# Patient Record
Sex: MALE | Race: WHITE | Employment: OTHER | URBAN - METROPOLITAN AREA
[De-identification: names, ages, dates, MRNs, and addresses within clinical notes are randomized per-mention and may not be internally consistent; named-entity substitution may affect disease eponyms.]

---

## 2017-04-25 ENCOUNTER — HOSPITAL ENCOUNTER (OUTPATIENT)
Dept: PHYSICAL THERAPY | Age: 57
Discharge: HOME | End: 2017-04-25
Payer: OTHER GOVERNMENT

## 2017-04-25 PROCEDURE — 97161 PT EVAL LOW COMPLEX 20 MIN: CPT

## 2017-04-25 PROCEDURE — 97110 THERAPEUTIC EXERCISES: CPT

## 2017-04-25 NOTE — PROGRESS NOTES
[facility]  [address]  [place], [address]  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 Exercises 3x/week including bike riding. The Plan of Care and following information is based on the information from the initial evaluation. 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 Pt states that he has difficulty with stair descent and ambulation due to quad weakness. He shows limited knee flexion due to lateral pain and reduced quad flexibility. Pt would benefit from PT to address deficits in ROM, strength, ambulation efficiency and balance for return to OF.     Evaluation Complexity History MEDIUM  Complexity : 1-2 comorbidities / personal factors will impact the outcome/ POC ; Examination LOW Complexity : 1-2 Standardized tests and measures addressing body structure, function, activity limitation and / or participation in recreation  ;Presentation LOW Complexity : Stable, uncomplicated  ;Clinical Decision Making MEDIUM Complexity : FOTO score of 26-74  Overall Complexity Rating: LOW   Problem List: pain affecting function, decrease ROM, decrease strength, edema affecting function, impaired gait/ balance, decrease ADL/ functional abilitiies, decrease activity tolerance and decrease flexibility/ joint mobility   Treatment Plan may include any combination of the following: Therapeutic exercise, Therapeutic activities, Neuromuscular re-education, Physical agent/modality, Gait/balance training, Manual therapy, Patient education, Self Care training, Functional mobility training and Stair training  Patient / Family readiness to learn indicated by: asking questions and trying to perform skills  Persons(s) to be included in education: patient (P)  Barriers to Learning/Limitations: none  Patient Goal (s): Back on my feet without pain  Patient Self Reported Health Status: good  Rehabilitation Potential: good    Short Term Goals: To be accomplished in 1 weeks:  1. Pt will be I and compliant with HEP to promote self management of condition. 2. Pt will demonstrate gait pattern with even step length to improve efficiency of community ambulation. Long Term Goals: To be accomplished in 4 weeks:  1. Pt will improve FOTO score to 66 to demonstrate improved quality of life and activity tolerance. 2. Pt will improve R knee flexion AROM to 130 deg for improved functional mobility and work performance. 3. Pt will improve R knee strength to 5/5 without pain for improved stability with community ambulation. 4. Pt will negotiate 4 stairs x3 with reciprocal pattern to improve ease of home entry/exit. Frequency / Duration: Patient to be seen 2 times per week for 4 weeks. Patient/ Caregiver education and instruction: Diagnosis, prognosis, self care and exercises   [x]  Plan of care has been reviewed with ARIANNE De Luna DPT 4/25/2017 5:46 PM    ________________________________________________________________________    I certify that the above Therapy Services are being furnished while the patient is under my care. I agree with the treatment plan and certify that this therapy is necessary.     [de-identified] Signature:____________________  Date:____________Time: _________    Please sign and return to [facility] [address]  [address] [place] [address] [address]  [phone] fax

## 2017-04-25 NOTE — PROGRESS NOTES
PT DAILY TREATMENT NOTE     Patient Name: Silvana Dillon  Date:2017  : 1960  [x]  Patient  Verified  Payor:  / Plan: [facility]  RETIREES AND DEPENDENTS / Product Type: Tania Fulton /    In time:5:03  Out time:5:40  Total Treatment Time (min): 37  Visit #: 1 of 8    Treatment Area: Right knee pain [M25.561]    SUBJECTIVE  Pain Level (0-10 scale): 4-5  Any medication changes, allergies to medications, adverse drug reactions, diagnosis change, or new procedure performed?: [x] No    [] Yes (see summary sheet for update)  Subjective functional status/changes:   [] No changes reported  \"It feels tight and weak\"    OBJECTIVE    25 min [x]Eval                  []Re-Eval       12 min Therapeutic Exercise:  [] See flow sheet :   Rationale: increase ROM, increase strength and improve coordination to improve the patients ability to perform daily tasks and ADLs with increased ease and efficiency              With   [] TE   [] TA   [] neuro   [] other: Patient Education: [x] Review HEP    [] Progressed/Changed HEP based on:   [] positioning   [] body mechanics   [] transfers   [] heat/ice application    [] other:      Other Objective/Functional Measures:   R knee AROM 0-94 deg     Pain Level (0-10 scale) post treatment: 2    ASSESSMENT/Changes in Function: see POC    Patient will continue to benefit from skilled PT services to modify and progress therapeutic interventions, address functional mobility deficits, address ROM deficits, address strength deficits, analyze and address soft tissue restrictions, analyze and cue movement patterns, analyze and modify body mechanics/ergonomics and address imbalance/dizziness to attain remaining goals. []  See Plan of Care  []  See progress note/recertification  []  See Discharge Summary         Progress towards goals / Updated goals:  Short Term Goals: To be accomplished in 1 weeks:  1.  Pt will be I and compliant with HEP to promote self management of condition. 2. Pt will demonstrate gait pattern with even step length to improve efficiency of community ambulation. Long Term Goals: To be accomplished in 4 weeks:  1. Pt will improve FOTO score to 66 to demonstrate improved quality of life and activity tolerance. 2. Pt will improve R knee flexion AROM to 130 deg for improved functional mobility and work performance. 3. Pt will improve R knee strength to 5/5 without pain for improved stability with community ambulation. 4. Pt will negotiate 4 stairs x3 with reciprocal pattern to improve ease of home entry/exit.     PLAN  []  Upgrade activities as tolerated     []  Continue plan of care  []  Update interventions per flow sheet       []  Discharge due to:_  []  Other:_      Jerrica Corrales DPT 4/25/2017  6:02 PM    Future Appointments  Date Time Provider Jacklyn Wilburn   4/27/2017 3:30 PM [id number] [facility]   5/1/2017 2:30 PM [facility]   5/3/2017 3:00 PM Terry Gomez, PT [facility]   5/9/2017 5:30 PM [id number] [facility]   5/12/2017 5:00 PM Terry Gomez, [facility]   5/16/2017 6:00 PM [id number] [place] [address]   5/18/2017 6:00 PM Terry Gomez, PT [facility]

## 2017-04-27 ENCOUNTER — HOSPITAL ENCOUNTER (OUTPATIENT)
Dept: PHYSICAL THERAPY | Age: 57
Discharge: HOME | End: 2017-04-27
Payer: OTHER GOVERNMENT

## 2017-04-27 PROCEDURE — 97140 MANUAL THERAPY 1/> REGIONS: CPT

## 2017-04-27 PROCEDURE — 97016 VASOPNEUMATIC DEVICE THERAPY: CPT

## 2017-04-27 PROCEDURE — 97110 THERAPEUTIC EXERCISES: CPT

## 2017-04-27 PROCEDURE — 97112 NEUROMUSCULAR REEDUCATION: CPT

## 2017-04-27 NOTE — PROGRESS NOTES
PT DAILY TREATMENT NOTE     Patient Name: Farrah Wick  Date:2017  : 1960  [x]  Patient  Verified  Payor:  / Plan: [facility]  RETIREES AND DEPENDENTS / Product Type: Chary Vega /    In time:3:30  Out time:4:28  Total Treatment Time (min): 58  1:1 Treatment Time: 50  Visit #: 2 of 8    Treatment Area: Right knee pain [M25.561]    SUBJECTIVE  Pain Level (0-10 scale): 2-3  Any medication changes, allergies to medications, adverse drug reactions, diagnosis change, or new procedure performed?: [x] No    [] Yes (see summary sheet for update)  Subjective functional status/changes:   [] No changes reported  Pt reports good HEP compliance and tolerance    OBJECTIVE    Modality rationale: decrease edema, decrease inflammation and decrease pain to improve the patients ability to perform daily tasks   Min Type Additional Details    [] Estim:  []Unatt       []IFC  []Premod                        []Other:  []w/ice   []w/heat  Position:  Location:    [] Estim: []Att    []TENS instruct  []NMES                    []Other:  []w/US   []w/ice   []w/heat  Position:  Location:    []  Traction: [] Cervical       []Lumbar                       [] Prone          []Supine                       []Intermittent   []Continuous Lbs:  [] before manual  [] after manual    []  Ultrasound: []Continuous   [] Pulsed                           []1MHz   []3MHz W/cm2:  Location:    []  Iontophoresis with dexamethasone         Location: [] Take home patch   [] In clinic    []  Ice     []  heat  []  Ice massage  []  Laser   []  Anodyne Position:  Location:    []  Laser with stim  []  Other:  Position:  Location:   10 [x]  Vasopneumatic Device Pressure:       [x] lo [] med [] hi   Temperature: [x] lo [] med [] hi   [] Skin assessment post-treatment:  []intact []redness- no adverse reaction    []redness  adverse reaction:       32 min Therapeutic Exercise:  [] See flow sheet :   Rationale: increase ROM and increase strength to improve the patients ability to ambulate and perform ADLs with increased ease    8 min Neuromuscular Re-education:  []  See flow sheet :   Rationale: increase strength, improve coordination and increase proprioception  to improve the patients ability to maintain good quad activation and eccentric motor control for improved stair descent     8 min Manual Therapy:  PROM R knee flexion emphasis, scar massage   Rationale: increase ROM and increase tissue extensibility to improve functional mobility and ADL ease          With   [] TE   [] TA   [] neuro   [] other: Patient Education: [x] Review HEP    [] Progressed/Changed HEP based on:   [] positioning   [] body mechanics   [] transfers   [] heat/ice application    [] other:      Other Objective/Functional Measures: pt requires tactile cuing to prevent L weight shift with mini squats, good carryover noted towards end of set    Tends to fall into hyperextended R stance knee with hip x3     Pain Level (0-10 scale) post treatment: 1    ASSESSMENT/Changes in Function: Pt tolerates first f/u session well with no reports of adverse response. Some quad soreness/discomfort reported with deeper knee flexion. Minimal cuing required for proper exercise technique and mechanics, good carryover noted. Continue per POC. Patient will continue to benefit from skilled PT services to modify and progress therapeutic interventions, address functional mobility deficits, address ROM deficits, address strength deficits, analyze and address soft tissue restrictions, analyze and cue movement patterns, analyze and modify body mechanics/ergonomics and instruct in home and community integration to attain remaining goals. []  See Plan of Care  []  See progress note/recertification  []  See Discharge Summary         Progress towards goals / Updated goals:  Short Term Goals: To be accomplished in 1 weeks:  1.  Pt will be I and compliant with HEP to promote self management of condition. Pt compliant 4/27/17  2. Pt will demonstrate gait pattern with even step length to improve efficiency of community ambulation. Long Term Goals: To be accomplished in 4 weeks:  1. Pt will improve FOTO score to 66 to demonstrate improved quality of life and activity tolerance. 2. Pt will improve R knee flexion AROM to 130 deg for improved functional mobility and work performance. 3. Pt will improve R knee strength to 5/5 without pain for improved stability with community ambulation.   4. Pt will negotiate 4 stairs x3 with reciprocal pattern to improve ease of home entry/exit    PLAN  []  Upgrade activities as tolerated     [x]  Continue plan of care  []  Update interventions per flow sheet       []  Discharge due to:_  []  Other:_      Meeta Luna DPT 4/27/2017  3:36 PM    Future Appointments  Date Time Provider Jacklyn Wilburn   5/1/2017 2:30 PM Rosita Sarah PT [facility]   5/3/2017 3:00 PM Olaf Garcia, PT [facility]   5/9/2017 5:30 PM [address] [facility]   5/12/2017 5:00 PM Olaf Rumautant, PT [facility]   5/16/2017 6:00 PM [id number] [place] [address]   5/18/2017 6:00 PM [place] Adjutant, PT [facility]

## 2017-05-01 ENCOUNTER — HOSPITAL ENCOUNTER (OUTPATIENT)
Dept: PHYSICAL THERAPY | Age: 57
Discharge: HOME | End: 2017-05-01
Payer: OTHER GOVERNMENT

## 2017-05-01 PROCEDURE — 97110 THERAPEUTIC EXERCISES: CPT

## 2017-05-01 PROCEDURE — 97016 VASOPNEUMATIC DEVICE THERAPY: CPT

## 2017-05-01 PROCEDURE — 97112 NEUROMUSCULAR REEDUCATION: CPT

## 2017-05-01 PROCEDURE — 97140 MANUAL THERAPY 1/> REGIONS: CPT

## 2017-05-01 NOTE — PROGRESS NOTES
PT DAILY TREATMENT NOTE     Patient Name: Margot Brennan  Date:2017  : 1960  [x]  Patient  Verified  Payor: [facility] / Plan: [facility]  RETIREES AND DEPENDENTS / Product Type: Aureliano Maw /    In time:3:01  Out time:3:53  Total Treatment Time (min): 52  1:1 Treatment Time: 43  Visit #: 3 of 8    Treatment Area: Right knee pain [M25.561]    SUBJECTIVE  Pain Level (0-10 scale): 4  Any medication changes, allergies to medications, adverse drug reactions, diagnosis change, or new procedure performed?: [x] No    [] Yes (see summary sheet for update)  Subjective functional status/changes:   [] No changes reported  \"My foot slipped on the carpet coming down the stairs before this, I thought I was going to die\"    OBJECTIVE    Modality rationale: decrease edema and decrease pain to improve the patients ability to perform daily tasks and ADLs   Min Type Additional Details    [] Estim:  []Unatt       []IFC  []Premod                        []Other:  []w/ice   []w/heat  Position:  Location:    [] Estim: []Att    []TENS instruct  []NMES                    []Other:  []w/US   []w/ice   []w/heat  Position:  Location:    []  Traction: [] Cervical       []Lumbar                       [] Prone          []Supine                       []Intermittent   []Continuous Lbs:  [] before manual  [] after manual    []  Ultrasound: []Continuous   [] Pulsed                           []1MHz   []3MHz W/cm2:  Location:    []  Iontophoresis with dexamethasone         Location: [] Take home patch   [] In clinic    []  Ice     []  heat  []  Ice massage  []  Laser   []  Anodyne Position:  Location:    []  Laser with stim  []  Other:  Position:  Location:   10 [x]  Vasopneumatic Device Pressure:       [x] lo [] med [] hi   Temperature: [x] lo [] med [] hi   [] Skin assessment post-treatment:  []intact []redness- no adverse reaction    []redness  adverse reaction:       24 min Therapeutic Exercise:  [] See flow sheet : Rationale: increase ROM and increase strength to improve the patients ability to perform daily tasks and ADLs with increased ease and efficiency    10 min Neuromuscular Re-education:  []  See flow sheet :   Rationale: increase strength, improve coordination and increase proprioception  to improve the patients ability to ambulate with improved efficiency and increased quad control    8 min Manual Therapy:  PROM R knee, patellar mobs   Rationale: increase ROM and increase tissue extensibility to improve functional mobility for improved ambulation efficiency         With   [] TE   [] TA   [] neuro   [] other: Patient Education: [x] Review HEP    [] Progressed/Changed HEP based on:   [] positioning   [] body mechanics   [] transfers   [] heat/ice application    [] other:      Other Objective/Functional Measures: pt shows improved step length symmetry with cuing, occasional loss due to quad weakness    R knee AAROM flexion 110 deg in supine     Pain Level (0-10 scale) post treatment: 1    ASSESSMENT/Changes in Function: Pt shows improved R knee ROM today with improved tolerance to manual therapy. He demonstrates limited quad strength eccentrically and with ambulation at this time. Will continue to progress quad stability as tolerated. Patient will continue to benefit from skilled PT services to modify and progress therapeutic interventions, address functional mobility deficits, address ROM deficits, address strength deficits, analyze and address soft tissue restrictions, analyze and cue movement patterns, analyze and modify body mechanics/ergonomics and instruct in home and community integration to attain remaining goals. []  See Plan of Care  []  See progress note/recertification  []  See Discharge Summary         Progress towards goals / Updated goals:  Short Term Goals: To be accomplished in 1 weeks:  1. Pt will be I and compliant with HEP to promote self management of condition. Pt compliant 4/27/17  2.  Pt will demonstrate gait pattern with even step length to improve efficiency of community ambulation. Progressed with cuing, intermittent lapse 5/1/17  Long Term Goals: To be accomplished in 4 weeks:  1. Pt will improve FOTO score to 66 to demonstrate improved quality of life and activity tolerance. 2. Pt will improve R knee flexion AROM to 130 deg for improved functional mobility and work performance. 3. Pt will improve R knee strength to 5/5 without pain for improved stability with community ambulation.   4. Pt will negotiate 4 stairs x3 with reciprocal pattern to improve ease of home entry/exit    PLAN  []  Upgrade activities as tolerated     []  Continue plan of care  []  Update interventions per flow sheet       []  Discharge due to:_  []  Other:_      Isabella Agarwal DPT 5/1/2017  3:01 PM    Future Appointments  Date Time Provider Jacklyn Wilburn   5/3/2017 3:00 PM [facility]   5/9/2017 5:30 PM [address] [facility]   5/12/2017 5:00 PM [facility]   5/16/2017 6:00 PM [address] [place] [address]   5/18/2017 6:00 PM [facility]

## 2017-05-03 ENCOUNTER — HOSPITAL ENCOUNTER (OUTPATIENT)
Dept: PHYSICAL THERAPY | Age: 57
Discharge: HOME | End: 2017-05-03
Payer: OTHER GOVERNMENT

## 2017-05-03 PROCEDURE — 97016 VASOPNEUMATIC DEVICE THERAPY: CPT

## 2017-05-03 PROCEDURE — 97140 MANUAL THERAPY 1/> REGIONS: CPT

## 2017-05-03 PROCEDURE — 97110 THERAPEUTIC EXERCISES: CPT

## 2017-05-03 NOTE — PROGRESS NOTES
PT DAILY TREATMENT NOTE     Patient Name: Silvana Dillon  Date:5/3/2017  : 1960  [x]  Patient  Verified  Payor: [facility] / Plan: [facility]  RETIREES AND DEPENDENTS / Product Type: Tania Fulton /    In time:3:00  Out time:3:53  Total Treatment Time (min): 48  Visit #: 4 of 8    Treatment Area: Right knee pain [M25.561]    SUBJECTIVE  Pain Level (0-10 scale): 10  Any medication changes, allergies to medications, adverse drug reactions, diagnosis change, or new procedure performed?: [x] No    [] Yes (see summary sheet for update)  Subjective functional status/changes:   [] No changes reported  The patient states that he continues to have pain with walking and especially going down stairs. OBJECTIVE  Modality rationale: decrease edema, decrease inflammation and decrease pain to improve the patients ability to improve ADL ease. Min Type Additional Details   10 [x]  Vasopneumatic Device Pressure:       [x] lo [] med [] hi   Temperature: [x] lo [] med [] hi   [x] Skin assessment post-treatment:  [x]intact []redness- no adverse reaction    []redness  adverse reaction:     35 min Therapeutic Exercise:  [x] See flow sheet :   Rationale: increase ROM and increase strength to improve the patients ability to improve ADL ease. 8 min Manual Therapy:  PROM flexion emphasis R knee with manual scar massage. Rationale: decrease pain, increase ROM and increase tissue extensibility to improve ADL ease. With   [] TE   [] TA   [] neuro   [] other: Patient Education: [x] Review HEP    [] Progressed/Changed HEP based on:   [] positioning   [] body mechanics   [] transfers   [] heat/ice application    [] other:      Other Objective/Functional Measures:    Knee flexion: 120 degrees pre-manual, 125 degrees post manual.  Negotiated large hurdles with hip compensation. Pain Level (0-10 scale) post treatment: 2/10    ASSESSMENT/Changes in Function: Good progress with knee flexion, approaching LTG. Upgraded quad exercises adding ankle weights, and progressing quad sets to usha. Patient will continue to benefit from skilled PT services to modify and progress therapeutic interventions, address functional mobility deficits, address ROM deficits, address strength deficits, analyze and address soft tissue restrictions, analyze and cue movement patterns, analyze and modify body mechanics/ergonomics, assess and modify postural abnormalities and instruct in home and community integration to attain remaining goals. []  See Plan of Care  []  See progress note/recertification  []  See Discharge Summary         Progress towards goals / Updated goals:  Short Term Goals: To be accomplished in 1 weeks:  1. Pt will be I and compliant with HEP to promote self management of condition. Pt compliant 4/27/17  2. Pt will demonstrate gait pattern with even step length to improve efficiency of community ambulation. Progressed with cuing, intermittent lapse 5/1/17  Long Term Goals: To be accomplished in 4 weeks:  1. Pt will improve FOTO score to 66 to demonstrate improved quality of life and activity tolerance. 2. Pt will improve R knee flexion AROM to 130 deg for improved functional mobility and work performance. Progressed 125 5/03/2017  3. Pt will improve R knee strength to 5/5 without pain for improved stability with community ambulation.   4. Pt will negotiate 4 stairs x3 with reciprocal pattern to improve ease of home entry/exit.      PLAN  []  Upgrade activities as tolerated     [x]  Continue plan of care  []  Update interventions per flow sheet       []  Discharge due to:_  []  Other:_      Deandra Galvez PT 5/3/2017  3:15 PM    Future Appointments  Date Time Provider Jacklyn Wilburn   5/9/2017 5:30 PM [address] [facility]   5/12/2017 5:00 PM Deandra Galvez PT [facility]   5/16/2017 6:00 PM [id number] [place] [address]   5/18/2017 6:00 PM Deandra Galvez PT [facility]

## 2017-05-09 ENCOUNTER — HOSPITAL ENCOUNTER (OUTPATIENT)
Dept: PHYSICAL THERAPY | Age: 57
Discharge: HOME | End: 2017-05-09
Payer: OTHER GOVERNMENT

## 2017-05-09 PROCEDURE — 97140 MANUAL THERAPY 1/> REGIONS: CPT

## 2017-05-09 PROCEDURE — 97110 THERAPEUTIC EXERCISES: CPT

## 2017-05-09 PROCEDURE — 97016 VASOPNEUMATIC DEVICE THERAPY: CPT

## 2017-05-09 PROCEDURE — 97112 NEUROMUSCULAR REEDUCATION: CPT

## 2017-05-09 NOTE — PROGRESS NOTES
PT DAILY TREATMENT NOTE     Patient Name: Magy Alcantara  Date:2017  : 1960  [x]  Patient  Verified  Payor:  / Plan: [facility]  RETIREES AND DEPENDENTS / Product Type: Juan José Merl /    In time:2:30  Out time:3:25  Total Treatment Time (min): 55  Visit #: 5 of 8    Treatment Area: Right knee pain [M25.561]    SUBJECTIVE  Pain Level (0-10 scale): 10  Any medication changes, allergies to medications, adverse drug reactions, diagnosis change, or new procedure performed?: [x] No    [] Yes (see summary sheet for update)  Subjective functional status/changes:   [] No changes reported  The patient reports that he feels his knee has improved even since last Thursday. He had a follow-up today, MD was happy with current progress. OBJECTIVE  Modality rationale: decrease edema, decrease inflammation and decrease pain to improve the patients ability to improve ADL ease. Min Type Additional Details   10 [x]  Vasopneumatic Device Pressure:       [x] lo [] med [] hi   Temperature: [x] lo [] med [] hi   [] Skin assessment post-treatment:  []intact []redness- no adverse reaction    []redness  adverse reaction:     37 min Therapeutic Exercise:  [x] See flow sheet :   Rationale: increase ROM and increase strength to improve the patients ability to improve ADL ease. 8 min Manual Therapy: Scar massage R knee   Rationale: decrease pain, increase ROM and increase tissue extensibility to improve ADL ease. With   [] TE   [] TA   [] neuro   [] other: Patient Education: [x] Review HEP    [] Progressed/Changed HEP based on:   [] positioning   [] body mechanics   [] transfers   [] heat/ice application    [] other:      Other Objective/Functional Measures:    Progressed quad exercises with SLR circles. FOTO: 49  \  Pain Level (0-10 scale) post treatment: 1/10    ASSESSMENT/Changes in Function: Good progress with quad strengthening and mobility up to this point.  FOTO score improvement marginally. Patient will continue to benefit from skilled PT services to modify and progress therapeutic interventions, address functional mobility deficits, address ROM deficits, address strength deficits, analyze and address soft tissue restrictions, analyze and cue movement patterns, analyze and modify body mechanics/ergonomics, assess and modify postural abnormalities and instruct in home and community integration to attain remaining goals. []  See Plan of Care  []  See progress note/recertification  []  See Discharge Summary         Progress towards goals / Updated goals:  Short Term Goals: To be accomplished in 1 weeks:  1. Pt will be I and compliant with HEP to promote self management of condition. Pt compliant 4/27/17  2. Pt will demonstrate gait pattern with even step length to improve efficiency of community ambulation. Progressed with cuing, intermittent lapse 5/1/17  Long Term Goals: To be accomplished in 4 weeks:  1. Pt will improve FOTO score to 66 to demonstrate improved quality of life and activity tolerance. Progressed to 49 5/09/2017.   2. Pt will improve R knee flexion AROM to 130 deg for improved functional mobility and work performance. Progressed 125 5/03/2017  3. Pt will improve R knee strength to 5/5 without pain for improved stability with community ambulation.   4. Pt will negotiate 4 stairs x3 with reciprocal pattern to improve ease of home entry/exit.        PLAN  []  Upgrade activities as tolerated     [x]  Continue plan of care  []  Update interventions per flow sheet       []  Discharge due to:_  []  Other:_      Jaskaran Campos, PT 5/9/2017  2:47 PM    Future Appointments  Date Time Provider Jacklyn Wilburn   5/12/2017 3:00 PM Jaskaran Campos, PT [facility]   5/16/2017 2:30 PM Jaskaran Campos, PT [facility]   5/18/2017 3:00 PM Jaskaran Skill, PT [facility]

## 2017-05-12 ENCOUNTER — HOSPITAL ENCOUNTER (OUTPATIENT)
Dept: PHYSICAL THERAPY | Age: 57
Discharge: HOME | End: 2017-05-12
Payer: OTHER GOVERNMENT

## 2017-05-12 PROCEDURE — 97016 VASOPNEUMATIC DEVICE THERAPY: CPT

## 2017-05-12 PROCEDURE — 97110 THERAPEUTIC EXERCISES: CPT

## 2017-05-12 PROCEDURE — 97140 MANUAL THERAPY 1/> REGIONS: CPT

## 2017-05-12 NOTE — PROGRESS NOTES
PT DAILY TREATMENT NOTE     Patient Name: Emory Moreno  Date:2017  : 1960  [x]  Patient  Verified  Payor:  / Plan: [facility]  RETIREES AND DEPENDENTS / Product Type: Tae Poplar /    In time:3:00  Out time: 3:55  Total Treatment Time (min): 54  Visit #: 6 of 8     Treatment Area: Right knee pain [M25.561]    SUBJECTIVE  Pain Level (0-10 scale): 4/10  Any medication changes, allergies to medications, adverse drug reactions, diagnosis change, or new procedure performed?: [x] No    [] Yes (see summary sheet for update)  Subjective functional status/changes:   [] No changes reported  The patient reports that his knee has been a little sore today. Notes that he does have popping with standing and walking. OBJECTIVE    Modality rationale: decrease edema, decrease inflammation and decrease pain to improve the patients ability to improve ADL ease. Min Type Additional Details   10 [x]  Vasopneumatic Device Pressure:       [x] lo [] med [] hi   Temperature: [] lo [] med [] hi   [] Skin assessment post-treatment:  [x]intact []redness- no adverse reaction    []redness  adverse reaction:      37 min Therapeutic Exercise:  [x] See flow sheet :   Rationale: increase ROM and increase strength to improve the patients ability to improve ADL ease. 8 min Manual Therapy:  PROM - R knee flexion/scar massage   Rationale: decrease pain, increase ROM and increase tissue extensibility to improve ADL ease. With   [] TE   [] TA   [] neuro   [] other: Patient Education: [x] Review HEP    [] Progressed/Changed HEP based on:   [] positioning   [] body mechanics   [] transfers   [] heat/ice application    [] other:      Other Objective/Functional Measures:   R knee flexion: 135 degrees      Pain Level (0-10 scale) post treatment: 2/10    ASSESSMENT/Changes in Function: Goal attained for knee ROM, attempted 4\" step downs with pain, unable at this time.  Noted click in knee through further ranges of flexion. Patient will continue to benefit from skilled PT services to modify and progress therapeutic interventions, address functional mobility deficits, address ROM deficits, address strength deficits, analyze and address soft tissue restrictions, analyze and cue movement patterns, analyze and modify body mechanics/ergonomics, assess and modify postural abnormalities and instruct in home and community integration to attain remaining goals. []  See Plan of Care  []  See progress note/recertification  []  See Discharge Summary         Progress towards goals / Updated goals:  Short Term Goals: To be accomplished in 1 weeks:  1. Pt will be I and compliant with HEP to promote self management of condition. Pt compliant 4/27/17  2. Pt will demonstrate gait pattern with even step length to improve efficiency of community ambulation. Progressed with cuing, intermittent lapse 5/1/17  Long Term Goals: To be accomplished in 4 weeks:  1. Pt will improve FOTO score to 66 to demonstrate improved quality of life and activity tolerance. Progressed to 49 5/09/2017.   2. Pt will improve R knee flexion AROM to 130 deg for improved functional mobility and work performance. Progressed 125 5/03/2017; Met - 135 5/12/2017. 3. Pt will improve R knee strength to 5/5 without pain for improved stability with community ambulation.   4. Pt will negotiate 4 stairs x3 with reciprocal pattern to improve ease of home entry/exit.      PLAN  []  Upgrade activities as tolerated     [x]  Continue plan of care  []  Update interventions per flow sheet       []  Discharge due to:_  []  Other:_      Siva Crum PT 5/12/2017  4:02 PM    Future Appointments  Date Time Provider Jacklyn Wilburn   5/16/2017 2:30 PM Siva Crum PT [facility]   5/18/2017 3:00 PM [facility]

## 2017-05-16 ENCOUNTER — HOSPITAL ENCOUNTER (OUTPATIENT)
Dept: PHYSICAL THERAPY | Age: 57
Discharge: HOME | End: 2017-05-16
Payer: OTHER GOVERNMENT

## 2017-05-16 PROCEDURE — 97016 VASOPNEUMATIC DEVICE THERAPY: CPT

## 2017-05-16 PROCEDURE — 97140 MANUAL THERAPY 1/> REGIONS: CPT

## 2017-05-16 PROCEDURE — 97110 THERAPEUTIC EXERCISES: CPT

## 2017-05-16 NOTE — PROGRESS NOTES
PT DAILY TREATMENT NOTE     Patient Name: Saralyn Babinski  Date:2017  : 1960  [x]  Patient  Verified  Payor:  / Plan: [facility]  RETIREES AND DEPENDENTS / Product Type:  /    In time:2:34  Out time:3:29  Total Treatment Time (min): 55  Visit #: 7 of 8    Treatment Area: Right knee pain [M25.561]    SUBJECTIVE  Pain Level (0-10 scale): 2-310  Any medication changes, allergies to medications, adverse drug reactions, diagnosis change, or new procedure performed?: [x] No    [] Yes (see summary sheet for update)  Subjective functional status/changes:   [] No changes reported  The patient denies any new complaints. OBJECTIVE  Modality rationale: decrease edema, decrease inflammation and decrease pain to improve the patients ability to improve ADL ease. Min Type Additional Details   10 [x]  Vasopneumatic Device Pressure:       [x] lo [] med [] hi   Temperature: [x] lo [] med [] hi   [] Skin assessment post-treatment:  []intact []redness- no adverse reaction    []redness  adverse reaction:     37 min Therapeutic Exercise:  [x] See flow sheet :   Rationale: increase ROM and increase strength to improve the patients ability to improve ADL ease. 8 min Manual Therapy:  Manual scar massage in sidelying quad stretch. Rationale: decrease pain, increase ROM, increase tissue extensibility and decrease trigger points to improve ADL ease. With   [] TE   [] TA   [] neuro   [] other: Patient Education: [x] Review HEP    [] Progressed/Changed HEP based on:   [] positioning   [] body mechanics   [] transfers   [] heat/ice application    [] other:      Other Objective/Functional Measures:   Performed manual with excellent scar mobility noted following. D/C manual, emphasis of strengthening. Pain Level (0-10 scale) post treatment: 2-310    ASSESSMENT/Changes in Function: Pt progressing well with strengthening.  Progressed to 2 sets of shuttle press for emphasis of quad, improved step ups to 8\". Patient will continue to benefit from skilled PT services to modify and progress therapeutic interventions, address functional mobility deficits, address ROM deficits, address strength deficits, analyze and address soft tissue restrictions, analyze and cue movement patterns, analyze and modify body mechanics/ergonomics, assess and modify postural abnormalities and instruct in home and community integration to attain remaining goals. []  See Plan of Care  []  See progress note/recertification  []  See Discharge Summary         Progress towards goals / Updated goals:  Short Term Goals: To be accomplished in 1 weeks:  1. Pt will be I and compliant with HEP to promote self management of condition. Pt compliant 4/27/17  2. Pt will demonstrate gait pattern with even step length to improve efficiency of community ambulation. Progressed with cuing, intermittent lapse 5/1/17  Long Term Goals: To be accomplished in 4 weeks:  1. Pt will improve FOTO score to 66 to demonstrate improved quality of life and activity tolerance. Progressed to 49 5/09/2017.   2. Pt will improve R knee flexion AROM to 130 deg for improved functional mobility and work performance. Progressed 125 5/03/2017; Met - 135 5/12/2017. 3. Pt will improve R knee strength to 5/5 without pain for improved stability with community ambulation.   4. Pt will negotiate 4 stairs x3 with reciprocal pattern to improve ease of home entry/exit.      PLAN  []  Upgrade activities as tolerated     [x]  Continue plan of care  []  Update interventions per flow sheet       []  Discharge due to:_  []  Other:_      Lorri Yoo PT 5/16/2017  5:21 PM    Future Appointments  Date Time Provider Jacklyn Wilburn   5/18/2017 3:00 PM Lorri Yoo PT [id number]

## 2017-05-18 ENCOUNTER — HOSPITAL ENCOUNTER (OUTPATIENT)
Dept: PHYSICAL THERAPY | Age: 57
Discharge: HOME | End: 2017-05-18
Payer: OTHER GOVERNMENT

## 2017-05-18 PROCEDURE — 97110 THERAPEUTIC EXERCISES: CPT

## 2017-05-18 PROCEDURE — 97016 VASOPNEUMATIC DEVICE THERAPY: CPT

## 2017-05-18 NOTE — PROGRESS NOTES
PT DAILY TREATMENT NOTE     Patient Name: Salbador Dyer  Date:2017  : 1960  [x]  Patient  Verified  Payor:  / Plan: [facility]  RETIREES AND DEPENDENTS / Product Type: Fitzpatrick Rushing /    In time:3:02  Out time:3:50  Total Treatment Time (min): 48  Visit #: 8 of 8    Treatment Area: Right knee pain [M25.561]    SUBJECTIVE  Pain Level (0-10 scale): 2/10  Any medication changes, allergies to medications, adverse drug reactions, diagnosis change, or new procedure performed?: [x] No    [] Yes (see summary sheet for update)  Subjective functional status/changes:   [] No changes reported  The patient states that he felt like his knee popped a few times. OBJECTIVE  Modality rationale: decrease edema, decrease inflammation and decrease pain to improve the patients ability to improve ADL ease. Min Type Additional Details   10 []  Vasopneumatic Device Pressure:       [x] lo [] med [] hi   Temperature: [x] lo [] med [] hi   [] Skin assessment post-treatment:  []intact []redness- no adverse reaction    []redness  adverse reaction:      min []Eval                  []Re-Eval       38 min Therapeutic Exercise:  [] See flow sheet :   Rationale: increase ROM and increase strength to improve the patients ability to improve ADL ease. min Manual Therapy:     Rationale:  to       With   [] TE   [] TA   [] neuro   [] other: Patient Education: [x] Review HEP    [] Progressed/Changed HEP based on:   [] positioning   [] body mechanics   [] transfers   [] heat/ice application    [] other:      Other Objective/Functional Measures:   Slight compensation with stairs noting use of 1 HR during. Quad strength: 5/5 MMT full extension 5/5 MMT 45 degrees, 4+/5 70 deegrees  HS strength: 5/5 MMT     Pain Level (0-10 scale) post treatment: 0/10    ASSESSMENT/Changes in Function: Good progress with PT up to this point. No cavitation or popping noted during single leg press. Utilized // bars for 4\" step downs. Noted compensations during descent of stairs with 1 HR required. Recommend 2 more weeks PT at 2 times per week to progres     Patient will continue to benefit from skilled PT services to modify and progress therapeutic interventions, address functional mobility deficits, address ROM deficits, address strength deficits, analyze and address soft tissue restrictions, analyze and cue movement patterns, analyze and modify body mechanics/ergonomics, assess and modify postural abnormalities and instruct in home and community integration to attain remaining goals. []  See Plan of Care  []  See progress note/recertification  []  See Discharge Summary         Progress towards goals / Updated goals:  Short Term Goals: To be accomplished in 1 weeks:  1. Pt will be I and compliant with HEP to promote self management of condition. Pt compliant 4/27/17  2. Pt will demonstrate gait pattern with even step length to improve efficiency of community ambulation. Progressed with cuing, intermittent lapse 5/1/17; Met - 5/18/2017. Long Term Goals: To be accomplished in 4 weeks:  1. Pt will improve FOTO score to 66 to demonstrate improved quality of life and activity tolerance. Progressed to 49 5/09/2017.   2. Pt will improve R knee flexion AROM to 130 deg for improved functional mobility and work performance. Progressed 125 5/03/2017; Met - 135 5/12/2017. 3. Pt will improve R knee strength to 5/5 without pain for improved stability with community ambulation. Nearly met 4+/5 70 degrees MMT quad, HS 90/90 5/18/2017. 4. Pt will negotiate 4 stairs x3 with reciprocal pattern to improve ease of home entry/exit. Progressing, requires 1 UE railing 5/18/2017     PLAN  []  Upgrade activities as tolerated     [x]  Continue plan of care  []  Update interventions per flow sheet       []  Discharge due to:_  []  Other:_      Joshua Vargas, PT 5/18/2017  3:06 PM    No future appointments.

## 2017-05-18 NOTE — PROGRESS NOTES
[facility]  [address]  [place], [address]  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 Exercises 3x/week including bike riding. Visits from Start of Care: 8    Missed Visits: 0      Key Functional Changes: Good progress with PT up to this point. No cavitation or popping noted during single leg press. Utilized // bars for 4\" step downs. Noted compensations during descent of stairs with 1 HR required. Recommend 2 more weeks PT at 2 times per week to progres     Short Term Goals: To be accomplished in 1 weeks:  1. Pt will be I and compliant with HEP to promote self management of condition. Pt compliant 17  2. Pt will demonstrate gait pattern with even step length to improve efficiency of community ambulation. Progressed with cuing, intermittent lapse 17; Met - 2017. Long Term Goals: To be accomplished in 4 weeks:  1. Pt will improve FOTO score to 66 to demonstrate improved quality of life and activity tolerance. Progressed to 49 2017.   2. Pt will improve R knee flexion AROM to 130 deg for improved functional mobility and work performance. Progressed 125 2017; Met - 135 2017. 3. Pt will improve R knee strength to 5/5 without pain for improved stability with community ambulation. Nearly met 4+/5 70 degrees MMT quad, HS 90/90 2017. 4. Pt will negotiate 4 stairs x3 with reciprocal pattern to improve ease of home entry/exit.  Progressing, requires 1 UE railing 5/18/2017    Updated Goals: to be achieved in 2 weeks:  1. Pt will improve FOTO score to 66 to demonstrate improved quality of life and activity tolerance. Progressed to 49 5/09/2017.   2. Pt will improve R knee strength to 5/5 without pain for improved stability with community ambulation. Nearly met 4+/5 70 degrees MMT quad, HS 90/90 5/18/2017. 3. Pt will negotiate 4 stairs x3 with reciprocal pattern to improve ease of home entry/exit. Progressing, requires 1 UE railing 5/18/2017    ASSESSMENT/RECOMMENDATIONS:  [x]Continue therapy per initial plan/protocol at a frequency of  2 x per week for 2 weeks  []Continue therapy with the following recommended changes:_____________________      _____________________________________________________________________  []Discontinue therapy progressing towards or have reached established goals  []Discontinue therapy due to lack of appreciable progress towards goals  []Discontinue therapy due to lack of attendance or compliance  []Await Physician's recommendations/decisions regarding therapy  []Other:________________________________________________________________    Thank you for this referral.    Patrick Lafleur, PT 5/18/2017 4:10 PM  NOTE TO PHYSICIAN:  [address] AND   FAX TO [facility] [facility]: [phone]  If you are unable to process this request in 24 hours please contact our office: [phone]    ? I have read the above report and request that my patient continue as recommended. ? I have read the above report and request that my patient continue therapy with the following changes/special instructions:__________________________________________________________  ? I have read the above report and request that my patient be discharged from therapy.     Physicians signature: ______________________________Date: ______Time:______

## 2017-05-22 ENCOUNTER — HOSPITAL ENCOUNTER (OUTPATIENT)
Dept: PHYSICAL THERAPY | Age: 57
Discharge: HOME | End: 2017-05-22
Payer: OTHER GOVERNMENT

## 2017-05-22 PROCEDURE — 97112 NEUROMUSCULAR REEDUCATION: CPT

## 2017-05-22 PROCEDURE — 97110 THERAPEUTIC EXERCISES: CPT

## 2017-05-22 PROCEDURE — 97016 VASOPNEUMATIC DEVICE THERAPY: CPT

## 2017-05-24 ENCOUNTER — HOSPITAL ENCOUNTER (OUTPATIENT)
Dept: PHYSICAL THERAPY | Age: 57
Discharge: HOME | End: 2017-05-24
Payer: OTHER GOVERNMENT

## 2017-05-24 PROCEDURE — 97112 NEUROMUSCULAR REEDUCATION: CPT

## 2017-05-24 PROCEDURE — 97016 VASOPNEUMATIC DEVICE THERAPY: CPT

## 2017-05-24 PROCEDURE — 97110 THERAPEUTIC EXERCISES: CPT

## 2017-05-24 NOTE — PROGRESS NOTES
PT DAILY TREATMENT NOTE     Patient Name: Faraz Garcia  Date:2017  : 1960  [x]  Patient  Verified  Payor:  / Plan: [facility]  RETIREES AND DEPENDENTS / Product Type: Amalia Fredy /    In time:904  Out time:950  Total Treatment Time (min): 55  Visit #: 2 of 4    Treatment Area: Right knee pain [M25.561]    SUBJECTIVE  Pain Level (0-10 scale): 1  Any medication changes, allergies to medications, adverse drug reactions, diagnosis change, or new procedure performed?: [x] No    [] Yes (see summary sheet for update)  Subjective functional status/changes:   [] No changes reported  Pt reports lateral knee pain with closed chain quad exercises    OBJECTIVE  10 min Therapeutic Exercise:  [x] See flow sheet :   Rationale: increase ROM and increase strength to improve the patients ability to increase ease of ADLs  26 min Neuromuscular Re-education:  [x]  See flow sheet :   Rationale: increase strength, improve coordination, improve balance and increase proprioception  to improve the patients ability to increase ease of ADLs     Modality rationale: decrease edema, decrease inflammation and decrease pain to improve the patients ability to increase ease of ADLs   Min Type Additional Details    [] Estim:  []Unatt       []IFC  []Premod                        []Other:  []w/ice   []w/heat  Position:  Location:    [] Estim: []Att    []TENS instruct  []NMES                    []Other:  []w/US   []w/ice   []w/heat  Position:  Location:    []  Traction: [] Cervical       []Lumbar                       [] Prone          []Supine                       []Intermittent   []Continuous Lbs:  [] before manual  [] after manual    []  Ultrasound: []Continuous   [] Pulsed                           []1MHz   []3MHz Location:  W/cm2:    []  Iontophoresis with dexamethasone         Location: [] Take home patch   [] In clinic    []  Ice     []  heat  []  Ice massage  []  Laser   []  Anodyne Position:  Location:    [] Laser with stim  []  Other: Position:  Location:   10 [x]  Vasopneumatic Device Pressure:       [] lo [] med [] hi   Temperature: [x] lo [] med [] hi   [x] Skin assessment post-treatment:  [x]intact []redness- no adverse reaction    []redness  adverse reaction:          With   [] TE   [] TA   [] neuro   [] other: Patient Education: [x] Review HEP    [] Progressed/Changed HEP based on:   [] positioning   [] body mechanics   [] transfers   [] heat/ice application    [] other:      Other Objective/Functional Measures: VMO atrophy is still prevalent. Added backward steps ups and heel raises to leg pressed to increase VMO activation. It did move activation more towards the rectus femoris but no detectable VMO activation was reported     Pain Level (0-10 scale) post treatment: 1    ASSESSMENT/Changes in Function: Patient continues to report less pain overall. No cavitation or pain with exercises today. Patient will continue to benefit from skilled PT services to modify and progress therapeutic interventions, address functional mobility deficits, address ROM deficits, address strength deficits, analyze and address soft tissue restrictions, analyze and cue movement patterns and analyze and modify body mechanics/ergonomics to attain remaining goals. []  See Plan of Care  []  See progress note/recertification  []  See Discharge Summary         Progress towards goals / Updated goals:  Progress towards goals / Updated goals:  1. Pt will improve FOTO score to 66 to demonstrate improved quality of life and activity tolerance. Progressed to 49 5/09/2017.   2. Pt will improve R knee strength to 5/5 without pain for improved stability with community ambulation. Nearly met 4+/5 70 degrees MMT quad, HS 90/90 5/18/2017. 3. Pt will negotiate 4 stairs x3 with reciprocal pattern to improve ease of home entry/exit.  Progressing, requires 1 UE railing 5/18/2017     PLAN  []  Upgrade activities as tolerated     [x]  Continue plan of care  []  Update interventions per flow sheet       []  Discharge due to:_  []  Other:_      Salty Moore, PT 5/24/2017  9:15 AM    Future Appointments  Date Time Provider Jacklyn Wilburn   5/31/2017 3:00 PM Olaf Adjutant, PT [facility]   6/2/2017 3:00 PM Olaf Adjutant, PT [facility]

## 2017-05-31 ENCOUNTER — APPOINTMENT (OUTPATIENT)
Dept: PHYSICAL THERAPY | Age: 57
End: 2017-05-31
Payer: OTHER GOVERNMENT

## 2017-06-02 ENCOUNTER — HOSPITAL ENCOUNTER (OUTPATIENT)
Dept: PHYSICAL THERAPY | Age: 57
Discharge: HOME | End: 2017-06-02
Payer: OTHER GOVERNMENT

## 2017-06-02 PROCEDURE — 97110 THERAPEUTIC EXERCISES: CPT

## 2017-06-02 PROCEDURE — 97016 VASOPNEUMATIC DEVICE THERAPY: CPT

## 2017-06-02 PROCEDURE — 97112 NEUROMUSCULAR REEDUCATION: CPT

## 2017-06-02 NOTE — PROGRESS NOTES
PT DAILY TREATMENT NOTE 12    Patient Name: Mickey Kidd  Date:2017  : 1960  [x]  Patient  Verified  Payor:  / Plan: [facility]  RETIREES AND DEPENDENTS / Product Type: Izzy Dwyer /    In time:3:00  Out time:3:56  Total Treatment Time (min): 64  Visit #: 3 of 4    Treatment Area: Right knee pain [M25.561]    SUBJECTIVE  Pain Level (0-10 scale): 0-1/10  Any medication changes, allergies to medications, adverse drug reactions, diagnosis change, or new procedure performed?: [x] No    [] Yes (see summary sheet for update)  Subjective functional status/changes:   [] No changes reported  The patient states that he went camping and he was able to descend/ascend stairs    OBJECTIVE  Modality rationale: decrease edema, decrease inflammation and decrease pain to improve the patients ability to improve ADL ease. Min Type Additional Details   10 [x]  Vasopneumatic Device Pressure:       [x] lo [] med [] hi   Temperature: [x] lo [] med [] hi   [] Skin assessment post-treatment:  []intact []redness- no adverse reaction    []redness  adverse reaction:     36 min Therapeutic Exercise:  [x] See flow sheet :   Rationale: increase ROM and increase strength to improve the patients ability to improve ADL ease    10 min Neuromuscular Re-education:  [x]  See flow sheet : single leg ball toss, bandwalks, valsides   Rationale: improve coordination, improve balance and increase proprioception  to improve the patients ability to improve ADL ease. min Manual Therapy:     Rationale:  to      min Gait Training:  ___ feet with ___ device on level surfaces with ___ level of assist   Rationale: With   [] TE   [] TA   [] neuro   [] other: Patient Education: [x] Review HEP    [] Progressed/Changed HEP based on:   [] positioning   [] body mechanics   [] transfers   [] heat/ice application    [] other:      Other Objective/Functional Measures:  The patient displays stair negotiation WNL  FOTO: 68       Pain Level (0-10 scale) post treatment: 0/10    ASSESSMENT/Changes in Function: The patrick ent has progressed well towards all goals allowing for improved quality of life per FOTO score, improve stair negotiation, and quad strength WNL. The patient has been D/C'd to continue HEP. Patient will continue to benefit from skilled PT services to modify and progress therapeutic interventions, address functional mobility deficits, address ROM deficits, address strength deficits, analyze and address soft tissue restrictions, analyze and cue movement patterns, analyze and modify body mechanics/ergonomics, assess and modify postural abnormalities and instruct in home and community integration to attain remaining goals. []  See Plan of Care  []  See progress note/recertification  []  See Discharge Summary         Progress towards goals / Updated goals:  1. Pt will improve FOTO score to 66 to demonstrate improved quality of life and activity tolerance. Progressed to 49 5/09/2017; Met 68  2. Pt will improve R knee strength to 5/5 without pain for improved stability with community ambulation. Nearly met 4+/5 70 degrees MMT quad, HS 90/90 5/18/2017; Met 5/5 MMT  3. Pt will negotiate 4 stairs x3 with reciprocal pattern to improve ease of home entry/exit. Progressing, requires 1 UE railing 5/18/2017; Met; no railing 6/02/2017. PLAN  []  Upgrade activities as tolerated     []  Continue plan of care  []  Update interventions per flow sheet       [x]  Discharge due to:   []  Other:_      Joshua Vargas, PT 6/2/2017  4:46 PM    No future appointments.

## 2017-06-02 NOTE — PROGRESS NOTES
[facility]  [address]  [place], [address]  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 Exercises 3x/week including bike riding. Visits from Start of Care: 11    Missed Visits: 1  Reporting Period : 2017 to 2017    Summary of Care:  1. Pt will improve FOTO score to 66 to demonstrate improved quality of life and activity tolerance. Progressed to 49 2017; Met 68  2. Pt will improve R knee strength to 5/5 without pain for improved stability with community ambulation. Nearly met 4+/5 70 degrees MMT quad, HS 90/90 2017; Met 5/5 MMT  3. Pt will negotiate 4 stairs x3 with reciprocal pattern to improve ease of home entry/exit. Progressing, requires 1 UE railing 2017; Met; no railing 2017. ASSESSMENT/RECOMMENDATIONS: The patrick ent has progressed well towards all goals allowing for improved quality of life per FOTO score, improve stair negotiation, and quad strength WNL. The patient has been D/C'd to continue HEP.      [x]Discontinue therapy: [x]Patient has reached or is progressing toward set goals      []Patient is non-compliant or has abdicated      []Due to lack of appreciable progress towards set [address], PT 2017 4:55 PM